# Patient Record
Sex: FEMALE | Race: WHITE | NOT HISPANIC OR LATINO | Employment: OTHER | URBAN - METROPOLITAN AREA
[De-identification: names, ages, dates, MRNs, and addresses within clinical notes are randomized per-mention and may not be internally consistent; named-entity substitution may affect disease eponyms.]

---
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
## 2025-08-01 ENCOUNTER — OFFICE VISIT (OUTPATIENT)
Dept: PRIMARY CARE | Facility: CLINIC | Age: 20
End: 2025-08-01
Payer: COMMERCIAL

## 2025-08-01 VITALS — DIASTOLIC BLOOD PRESSURE: 68 MMHG | WEIGHT: 189 LBS | SYSTOLIC BLOOD PRESSURE: 116 MMHG | HEART RATE: 64 BPM

## 2025-08-01 DIAGNOSIS — D17.1 LIPOMA OF TORSO: Primary | ICD-10-CM

## 2025-08-01 PROBLEM — R22.32 LUMP IN ARMPIT, LEFT: Status: ACTIVE | Noted: 2025-08-01

## 2025-08-01 PROBLEM — S99.231A: Status: ACTIVE | Noted: 2025-08-01

## 2025-08-01 PROBLEM — L70.0 ACNE VULGARIS: Status: ACTIVE | Noted: 2022-02-16

## 2025-08-01 PROBLEM — L30.5 PITYRIASIS ALBA: Status: ACTIVE | Noted: 2022-02-16

## 2025-08-01 PROBLEM — R22.1 LUMP IN NECK: Status: ACTIVE | Noted: 2025-08-01

## 2025-08-01 PROBLEM — M72.2 PLANTAR FASCIAL FIBROMATOSIS OF RIGHT FOOT: Status: ACTIVE | Noted: 2025-08-01

## 2025-08-01 PROCEDURE — 99213 OFFICE O/P EST LOW 20 MIN: CPT | Performed by: FAMILY MEDICINE

## 2025-08-01 PROCEDURE — 1036F TOBACCO NON-USER: CPT | Performed by: FAMILY MEDICINE

## 2025-08-01 RX ORDER — TRETINOIN 0.5 MG/G
CREAM TOPICAL NIGHTLY
COMMUNITY
Start: 2025-05-27

## 2025-08-01 RX ORDER — CLINDAMYCIN PHOSPHATE 10 MG/G
GEL TOPICAL
COMMUNITY
Start: 2025-01-13 | End: 2025-08-01 | Stop reason: ALTCHOICE

## 2025-08-01 ASSESSMENT — PATIENT HEALTH QUESTIONNAIRE - PHQ9
1. LITTLE INTEREST OR PLEASURE IN DOING THINGS: NOT AT ALL
SUM OF ALL RESPONSES TO PHQ9 QUESTIONS 1 AND 2: 0
2. FEELING DOWN, DEPRESSED OR HOPELESS: NOT AT ALL

## 2025-08-01 ASSESSMENT — PAIN SCALES - GENERAL: PAINLEVEL_OUTOF10: 0-NO PAIN

## 2025-08-01 ASSESSMENT — COLUMBIA-SUICIDE SEVERITY RATING SCALE - C-SSRS
2. HAVE YOU ACTUALLY HAD ANY THOUGHTS OF KILLING YOURSELF?: NO
1. IN THE PAST MONTH, HAVE YOU WISHED YOU WERE DEAD OR WISHED YOU COULD GO TO SLEEP AND NOT WAKE UP?: NO
6. HAVE YOU EVER DONE ANYTHING, STARTED TO DO ANYTHING, OR PREPARED TO DO ANYTHING TO END YOUR LIFE?: NO

## 2025-08-01 NOTE — PROGRESS NOTES
Subjective   Patient ID: Kusum Andrade is a 19 y.o. female who presents for Lump on right back upper shoulder area (X 12 days).    HPI   She presents today for a lump on her back.  Her sibling actually saw it on her back when she was at [employer].  She did not felt it at all prior to that or really noticed.  She states that was about 2 weeks ago.  There is no discharge from it.    Review of Systems    Objective   /68   Pulse 64   Wt 85.7 kg (189 lb)   LMP 07/07/2025     Physical Exam  She is alert, no apparent distress, her affect is pleasant.  Respirations are easy.  On her right back just over the right scapula she has an approximately 6 to 7 mm lipoma.  Somewhat mobile under the skin.  Nontender to palpate.  Skin overlying is completely normal and there is no punctum    Assessment/Plan   Diagnoses and all orders for this visit:  Lipoma of torso  Discussed that this is a benign growth.  If this does become larger or is hurting her at all, we can have this removed.

## 2025-08-01 NOTE — PATIENT INSTRUCTIONS
"This appears to be a lipoma, which is a fatty benign \"tumor\".   If this bothers you we can have it removed by plastic surgeon.   "